# Patient Record
Sex: FEMALE | Race: WHITE | Employment: UNEMPLOYED | URBAN - METROPOLITAN AREA
[De-identification: names, ages, dates, MRNs, and addresses within clinical notes are randomized per-mention and may not be internally consistent; named-entity substitution may affect disease eponyms.]

---

## 2020-10-22 ENCOUNTER — HOSPITAL ENCOUNTER (EMERGENCY)
Age: 25
Discharge: HOME | End: 2020-10-22
Attending: EMERGENCY MEDICINE
Payer: OTHER GOVERNMENT

## 2020-10-22 VITALS
OXYGEN SATURATION: 100 % | BODY MASS INDEX: 43.41 KG/M2 | HEIGHT: 63 IN | WEIGHT: 245 LBS | RESPIRATION RATE: 20 BRPM | DIASTOLIC BLOOD PRESSURE: 69 MMHG | HEART RATE: 72 BPM | SYSTOLIC BLOOD PRESSURE: 132 MMHG | TEMPERATURE: 97.5 F

## 2020-10-22 DIAGNOSIS — S03.00XA CLOSED DISLOCATION OF JAW, INITIAL ENCOUNTER: Primary | ICD-10-CM

## 2020-10-22 PROCEDURE — 75810000301 HC ER LEVEL 1 CLOSED TREATMNT FRACTURE/DISLOCATION

## 2020-10-22 PROCEDURE — 99282 EMERGENCY DEPT VISIT SF MDM: CPT

## 2020-10-22 NOTE — ED PROVIDER NOTES
EMERGENCY DEPARTMENT HISTORY AND PHYSICAL EXAM    Date: 10/22/2020  Patient Name: Gabriele Brown    History of Presenting Illness     Chief Complaint   Patient presents with    TMJ         History Provided By: Patient    [id number]  Gabriele Brown is a 31 y.o. female with PMHX of TMJ, jaw dislocations who presents to the emergency department C/O jaw pain. Patient says that she was yawning this morning and her dog became stuck open. States this is happened to her multiple times in the past.  She says that she tried to reduce it on her own but was unsuccessful. PCP: No primary care provider on file. Past History     Past Medical History:  Past Medical History:   Diagnosis Date    TMJ (dislocation of temporomandibular joint)        Past Surgical History:  Past Surgical History:   Procedure Laterality Date    HX APPENDECTOMY      HX CHOLECYSTECTOMY         Family History:  History reviewed. No pertinent family history. Social History:  Social History     Tobacco Use    Smoking status: Never Smoker    Smokeless tobacco: Never Used   Substance Use Topics    Alcohol use: Not Currently    Drug use: Not on file       Allergies: Allergies   Allergen Reactions    Amoxicillin Rash         Review of Systems   Review of Systems   HENT:        Jaw pain   Respiratory: Negative for shortness of breath. Gastrointestinal: Negative for vomiting. All other systems reviewed and are negative. Physical Exam     Vitals:    10/22/20 1132   BP: 132/69   Pulse: 72   Resp: 20   Temp: 97.5 °F (36.4 °C)   SpO2: 100%   Weight: 111.1 kg (245 lb)   Height: 5' 3\" (1.6 m)     Physical Exam  Vitals signs and nursing note reviewed. Constitutional:       General: She is not in acute distress. Appearance: Normal appearance. She is not ill-appearing. HENT:      Head: Normocephalic and atraumatic. Mouth/Throat:      Comments: Jaw stuck open  Eyes:      Extraocular Movements: Extraocular movements intact. Conjunctiva/sclera: Conjunctivae normal.   Neck:      Musculoskeletal: Normal range of motion. Cardiovascular:      Rate and Rhythm: Normal rate and regular rhythm. Pulmonary:      Effort: Pulmonary effort is normal. No respiratory distress. Musculoskeletal: Normal range of motion. General: No deformity. Neurological:      General: No focal deficit present. Mental Status: She is alert and oriented to person, place, and time. Mental status is at baseline. Psychiatric:         Mood and Affect: Mood normal.         Behavior: Behavior normal.           Diagnostic Study Results     Labs -   No results found for this or any previous visit (from the past 12 hour(s)). Radiologic Studies -   No orders to display     CT Results  (Last 48 hours)    None        CXR Results  (Last 48 hours)    None          Medications given in the ED-  Medications - No data to display      Medical Decision Making   I am the first provider for this patient. I reviewed the vital signs, available nursing notes, past medical history, past surgical history, family history and social history. Vital Signs-Reviewed the patient's vital signs. Pulse Oximetry Analysis and Interpretation:   100% on RA, normal      Records Reviewed: Nursing Notes    Provider Notes (Medical Decision Making): Shweta Birmingham is a 31 y.o. female presents here with mandible dislocation. History of frequent dislocations in the past.  Reduced at bedside. Counseled patient on care instructions including avoiding yawning or other jaw opening and soft diet. She is new to this area so I referred her to primary care and ENT here    Procedures:  DISLOCATION-UPPER EXT (ASAP ONLY)    Date/Time: 10/22/2020 11:49 AM  Performed by: Coral Condon MD  Authorized by: Coral Condon MD     Consent:     Consent obtained:  Verbal    Consent given by:  Patient  Location:     Injury location: mandible.   Pre-procedure assessment:     Pre-procedure imaging:  None    Imaging findings: dislocation present    Anesthesia (see MAR for exact dosages): Anesthesia method:  None  Procedure details:     Manipulation performed: yes      Reduction successful: yes      Reduction confirmed with imaging: no    Post-procedure assessment:     Range of motion: improved      Patient tolerance of procedure: Tolerated well, no immediate complications  Comments:      Dislocation initially attempted with rolling technique using empty syringe however patient was unable to self reduce using this method. Mandible subsequently reduced with direct pressure on lower mandible with down and backwards pressure. Tolerated well. ED Course:       Diagnosis and Disposition     Critical Care:     DISCHARGE NOTE:    Yang Perla  results have been reviewed with her. She has been counseled regarding her diagnosis, treatment, and plan. She verbally conveys understanding and agreement of the signs, symptoms, diagnosis, treatment and prognosis and additionally agrees to follow up as discussed. She also agrees with the care-plan and conveys that all of her questions have been answered. I have also provided discharge instructions for her that include: educational information regarding their diagnosis and treatment, and list of reasons why they would want to return to the ED prior to their follow-up appointment, should her condition change. She has been provided with education for proper emergency department utilization. CLINICAL IMPRESSION:    1. Closed dislocation of jaw, initial encounter        PLAN:  1. D/C Home  2. There are no discharge medications for this patient.     3.   Follow-up Information     Follow up With Specialties Details Why [address]    [facility] EMERGENCY DEPT Emergency Medicine Go to  If symptoms worsen [address]  [phone]    Miles Momin MD Internal Medicine Schedule an appointment as soon as possible for a visit For primary care [facility] Alexei Barrientos MD Otolaryngology, Surgery Schedule an appointment as soon as possible for a visit  ENT [address]  [phone]          _______________________________      Please note that this dictation was completed with [employer], the computer voice recognition software. Quite often unanticipated grammatical, syntax, homophones, and other interpretive errors are inadvertently transcribed by the computer software. Please disregard these errors. Please excuse any errors that have escaped final proofreading.